# Patient Record
(demographics unavailable — no encounter records)

---

## 2024-11-15 NOTE — DATA REVIEWED
[de-identified] : 11/11/2024: AP and lateral full-length spine x-ray ordered, obtained and independently reviewed today revealing left thoracolumbar curve measuring about 42 degrees.  Unchanged from previous imaging Risser 4+.  Moderate postural kyphosis.  No spondylolisthesis  AP and lateral spine radiographs were ordered, obtained, and independently reviewed in clinic on 08/24/2023 depicting a lumbar curve of 45 degrees. Patient is Risser 4+. Moderate postural kyphosis. No evidence of spondylolysis or spondylolisthesis.   AP and lateral spine radiographs were ordered, obtained, and independently reviewed in clinic on 08/24/2023 depicting a lumbar curve of 47 degrees. Patient is Risser 4. Moderate postural kyphosis. No evidence of spondylolysis or spondylolisthesis.   12/28/2023: AP and lateral full-length spine x-ray ordered, obtained and reviewed independently revealing left thoracolumbar curve measuring about 47 degrees, unchanged from previous.  Risser 4.  Moderate postural kyphosis.  No spondylolisthesis

## 2024-11-15 NOTE — HISTORY OF PRESENT ILLNESS
[0] : currently ~his/her~ pain is 0 out of 10 [FreeTextEntry1] : Allison is a 16-year-old female with DiGeorge syndrome who presents today with her mother for followup regarding scoliosis, previously treated with TLSO.  She had presented to our office for second opinion in August 2023.  Mother reports that their pediatrician noticed asymmetries two years during a physical examination and advised family to follow up with an orthopedist. Patient was diagnosed with scoliosis measuring 50 degrees at [facility] and surgery was recommended.  She was utilizing a TLSO brace 8-10 hours per day.  TLSO was discontinued after last visit in August 2023 for skeletal maturity.  Option of surgery was discussed at previous visits.  Family is not interested at this time, but know that she will likely undergo surgery in the future.  Mother denies regular home exercise regimen.  She reports poor posture.  Patient also has a history of flat feet. Patient denies any recent fevers, chills, or night sweats. Denies any recent trauma or injuries. She denies any back pain, radiating pain, numbness, tingling sensations, discomfort, weakness to LE, radiating LE pain, or bladder/bowel dysfunction. She has been participating in all her normal physical activities without restrictions or discomfort. Mother denies any family history of scoliosis.  She presents today for continued management regarding the same.

## 2024-11-15 NOTE — PHYSICAL EXAM
[FreeTextEntry1] : General: healthy appearing, acting appropriate for age.  HEENT: NCAT, Normal conjunctiva Cardio: Appears well perfused, no peripheral edema, brisk cap refill.  Lungs: no obvious increased WOB, no audible wheeze heard without use of stethoscope.  Abdomen: not examined.  Skin: No visible rashes on exposed skin  Musculoskeletal:.  Examination of the back reveals significant shoulder asymmetry with left shoulder higher than right.  Left scapula is more prominent than left. Head and shoulders are not level. Patient is tilting toward the left. Flank asymmetry with right sided waist deep creasing. On forward bending, left sided rib hump deformity noted. Patient is able to bend forward and touch the toes as well bend backwards without pain.  Lateral flexion is symmetrical and is pain free.  Straight leg raising test is free to more than 70 degrees. Moderate postural kyphosis, fully correctable on hyperextension.  Neurological examination reveals a grade 5/5 muscle power.  Sensation is intact to crude touch and pinprick.  Deep tendon reflexes are 1+ with ankle jerk and knee jerk.  The plantars are bilaterally down going.  Superficial abdominal reflexes are symmetric and intact.  The biceps and triceps reflexes are 1+.     There is no hairy patch, lipoma, sinus in the back.  There is no pes cavus, asymmetry of calves, significant leg length discrepancy or significant cafe-au-lait spots.  Child is able to walk on tiptoes as well as heels without difficulty or pain. Child is able to jump and squat

## 2024-11-15 NOTE — BIRTH HISTORY
[] :  [Normal?] : normal delivery [___ lbs.] : [unfilled] lbs [___ oz.] : [unfilled] oz. [Was child in NICU?] : Child was in NICU [FreeTextEntry7] : heart surgery at 2 weeks

## 2024-11-15 NOTE — REVIEW OF SYSTEMS
[Heart Problems] : heart problems [No Acute Changes] : No acute changes since previous visit [Change in Activity] : no change in activity [Fever Above 102] : no fever [Rash] : no rash [Itching] : no itching [Back Pain] : ~T no back pain

## 2024-11-15 NOTE — DATA REVIEWED
[de-identified] : 11/11/2024: AP and lateral full-length spine x-ray ordered, obtained and independently reviewed today revealing left thoracolumbar curve measuring about 42 degrees.  Unchanged from previous imaging Risser 4+.  Moderate postural kyphosis.  No spondylolisthesis  AP and lateral spine radiographs were ordered, obtained, and independently reviewed in clinic on 08/24/2023 depicting a lumbar curve of 45 degrees. Patient is Risser 4+. Moderate postural kyphosis. No evidence of spondylolysis or spondylolisthesis.   AP and lateral spine radiographs were ordered, obtained, and independently reviewed in clinic on 08/24/2023 depicting a lumbar curve of 47 degrees. Patient is Risser 4. Moderate postural kyphosis. No evidence of spondylolysis or spondylolisthesis.   12/28/2023: AP and lateral full-length spine x-ray ordered, obtained and reviewed independently revealing left thoracolumbar curve measuring about 47 degrees, unchanged from previous.  Risser 4.  Moderate postural kyphosis.  No spondylolisthesis

## 2024-11-15 NOTE — HISTORY OF PRESENT ILLNESS
[0] : currently ~his/her~ pain is 0 out of 10 [FreeTextEntry1] : Allison is a 16-year-old female with DiGeorge syndrome who presents today with her mother for followup regarding scoliosis, previously treated with TLSO.  She had presented to our office for second opinion in August 2023.  Mother reports that their pediatrician noticed asymmetries two years during a physical examination and advised family to follow up with an orthopedist. Patient was diagnosed with scoliosis measuring 50 degrees at [facility] and surgery was recommended.  She was utilizing a TLSO brace 8-10 hours per day.  TLSO was discontinued after last visit in August 2023 for skeletal maturity.  Option of surgery was discussed at previous visits.  Family is not interested at this time, but know that she will likely undergo surgery in the future.  Mother denies regular home exercise regimen.  She reports poor posture.  Patient also has a history of flat feet. Patient denies any recent fevers, chills, or night sweats. Denies any recent trauma or injuries. She denies any back pain, radiating pain, numbness, tingling sensations, discomfort, weakness to LE, radiating LE pain, or bladder/bowel dysfunction. She has been participating in all her normal physical activities without restrictions or discomfort. Mother denies any family history of scoliosis.  She presents today for continued management regarding the same.

## 2025-07-29 NOTE — ASSESSMENT
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  From pulmonary perspective, Allison has history of scoliosis. She also has history of mild dysphagia due to cleft palate and right VCP after her cardiac surgery. She however is not on medicine and is able to eat and drink by mouth. There is no history of chronic cough, recurrent wheezing or shortness of breath with and without physical activity. She sees cardiology and recently had a normal stress test. Her spirometry showed restriction however the test is not valid because of inadequate technique.   From sleep perspective, she sleeps well throughout the night. There is no history of snoring. There is no history of restless sleep, insomnia or parasomnia.  Plan: I had a long discussion with the parent. I discussed the results of the PFTs with the patient and mother. I also discussed the effect of pectus on pulmonary functions. He is asymptomatic and his PFT is normal. Therefore, no medicine is warranted at this time from pulmonary and sleep perspective.  1. She is clear for scoliosis surgery from pulmonary & sleep perspective. 2. She does not need any specific pre-operative management and will need routine post-operative care like incentive spirometry to prevent post-operative pulmonary complications. 3. I will see her in 6 months after the scoliosis repair to repeat PFTs.

## 2025-07-29 NOTE — END OF VISIT
[FreeTextEntry3] : All information documented by staff members, review of systems, past medical history, family history, social history, surgical history, medications, allergies, immunizations and vital signs were reviewed. I obtained the history and examined the patient. I reviewed the chart for pertinent history, lab results, radiological images and procedures. The assessment and plan was discussed with the family.  Code selected for this visit is based on time. The total time spent performing E/M services today is 45 minutes. Time includes preparing to see patient, reviewing diagnostic studies and records, direct face-to-face visit, completing orders, medication reconciliation, prescription management, and care coordination. Time spent documenting clinical information in the record is also included. Time reported does not include any separately reported service or time.

## 2025-07-29 NOTE — HISTORY OF PRESENT ILLNESS
[FreeTextEntry1] : Allison has history of scoliosis. She also has history of mild dysphagia due to cleft palate and right VCP after her cardiac surgery. She however is not on medicine and is able to eat and drink by mouth. There is no history of chronic cough, recurrent wheezing or shortness of breath with and without physical activity. She sees cardiology and recently had a normal stress test.   There is history of feeding difficulties including occasional cough or choking with feeding. She had a swallow study in infancy and was fed via NG feeds for a month after she was born. There is no history of noisy breathing. There is no history of recurrent pneumonias.  ALLERGY SYMPTOMS: There is no history of nasal and ocular allergy symptoms. There is no history of itchy or watery eyes, itchy or watery nose, and chronic nasal congestion. The patient does not use any antiallergic and/or nasal steroids.  SLEEP SYMPTOMS: There is no history of loud snoring, respiratory pauses during sleep, restless sleep, sleep onset and maintenance insomnia. The patient did not have a sleep study.  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 There is no history of seizure.   NEONATOLOGY HISTORY: The patient was born at 39 weeks via  for fetal distress. The patient's birth weight was 5 lb 1 oz. There were no complications during pregnancy. She was diagnosed with DiGeorge during intrauterine life and underwent cardiac repair on 2nd DOL and was in the NICU for 2 weeks.   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 There is no history of adenotonsillectomy.   FAMILY HISTORY: There is no family history of asthma, eczema and severe allergies. There is no family history of JENNIFER, RLS and narcolepsy.  SOCIAL HISTORY: The patient lives with the parents and sibling. There is no smoke exposure at home. They have 3 dogs and a cat at home.

## 2025-07-29 NOTE — REASON FOR VISIT
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 From pulmonary and sleep perspective, she has history of occasional cough and choking with the feeding . The patient is here for an initial evaluation. The history was obtained from the patient and mother.

## 2025-07-29 NOTE — PHYSICAL EXAM
[TextEntry] : General: Well-developed, well-nourished, no acute distress, well-appearing, active and playful. Eyes: Extra-ocular movements intact, conjunctiva clear. Head: normocephalic, atraumatic. Ear: Right ear: Normal external ear, non-erythematous tympanic membrane Left ear: Normal external ear, non-erythematous tympanic membrane Nose: Right nose: Nasal cavity is patent, normal nasal mucosa, turbinates are not enlarged Left nose: Nasal cavity is patent, Normal nasal mucosa, turbinates are not enlarged Throat: Oral mucous membranes moist and pink, no oral lesions, normal dentition and gingiva, tonsils 1+ BL, Mallampati class IV. Neck: Supple, trachea midline, no masses. Cardiovascular: Regular rate and rhythm, no murmurs appreciated; capillary refill < 2 second; 2+ radial pulses bilaterally; no edema. Respiratory: Scoliosis affecting the chest wall, symmetric chest rise, breathing non-labored, no retractions, no nasal flaring, no tracheal tug, lungs clear to auscultation BL, good aeration BL through all lung fields, no crackles, no wheezes, no upper airway transmitted sounds. GI: Abdomen soft, nontender, nondistended, normal bowel sounds, no masses, no organomegaly. Lymph: No cervical lymphadenopathy appreciated. Musculoskeletal: + scoliosis. Extremities: Warm, well-perfused, no digital clubbing or cyanosis. Skin: Warm, dry, no rashes. Neurology: Awake, alert, and interactive, normal affect, developmentally appropriate.

## 2025-07-29 NOTE — IMPRESSION
[TextEntry] : Interpretation: Spirometry reveals symmetrically reduced FVC and FEV1 (< 80%) suggestive of a restrictive defect. Lung volumes are needed to confirm the restriction. Bronchodilator response, lung volumes, airway resistance and diffusion capacity were not checked. There is no prior study to compare. Of note, the patient had difficulty to perform procedure. This test does not meet ATS criteria of acceptability and reproducibility.